# Patient Record
Sex: FEMALE | Race: WHITE
[De-identification: names, ages, dates, MRNs, and addresses within clinical notes are randomized per-mention and may not be internally consistent; named-entity substitution may affect disease eponyms.]

---
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
## 2020-03-31 ENCOUNTER — HOSPITAL ENCOUNTER (INPATIENT)
Dept: HOSPITAL 56 - MW.OB | Age: 20
LOS: 2 days | Discharge: HOME | DRG: 560 | End: 2020-04-02
Attending: OBSTETRICS & GYNECOLOGY | Admitting: OBSTETRICS & GYNECOLOGY
Payer: COMMERCIAL

## 2020-03-31 DIAGNOSIS — Z3A.40: ICD-10-CM

## 2020-03-31 DIAGNOSIS — Z88.0: ICD-10-CM

## 2020-03-31 DIAGNOSIS — O48.0: Primary | ICD-10-CM

## 2020-03-31 NOTE — PCM.PREANE
Preanesthetic Assessment





- Procedure


Proposed Procedure: 


continuous labor epidural





- Anesthesia/Transfusion/Family Hx


Anesthesia History: No Prior Anesthesia


Family History of Anesthesia Reaction: Yes


Family History of Anesthesia Reaction, Other: grandmother shakes when coming 

out of anesthesia





- Review of Systems


General: No Symptoms


Pulmonary: No Symptoms


Cardiovascular: No Symptoms, Other (METS>4, no SOB with stairs or when walk)


Gastrointestinal: No Symptoms


Neurological: Headache


Other: Reports: Depression, Anxiety





- Physical Assessment


NPO Status Date: 03/31/20


NPO Status Time: 21:30


Height: 5 ft 1 in


Weight: 87.543 kg


ASA Class: 2


Mental Status: Alert & Oriented x3


Airway Class: Mallampati = 2


Dentition: Reports: Normal Dentition


Thyro-Mental Finger Breadths: 4


Mouth Opening Finger Breadths: 3


ROM/Head Extension: Full


Lungs: Clear to Auscultation, Normal Respiratory Effort


Cardiovascular: Regular Rate, Regular Rhythm





- Lab


Values: 





 Laboratory Last Values











WBC  10.97 K/uL (4.0-11.0)   03/31/20  15:40    


 


RBC  4.06 M/uL (4.30-5.90)  L  03/31/20  15:40    


 


Hgb  11.5 g/dL (12.0-16.0)  L  03/31/20  15:40    


 


Hct  35.1 % (36.0-46.0)  L  03/31/20  15:40    


 


MCV  86.5 fL (80.0-98.0)   03/31/20  15:40    


 


MCH  28.3 pg (27.0-32.0)   03/31/20  15:40    


 


MCHC  32.8 g/dL (31.0-37.0)   03/31/20  15:40    


 


RDW Std Deviation  43.2 fl (28.0-62.0)   03/31/20  15:40    


 


RDW Coeff of Franki  14 % (11.0-15.0)   03/31/20  15:40    


 


Plt Count  185 K/uL (150-400)   03/31/20  15:40    


 


MPV  10.10 fL (7.40-12.00)   03/31/20  15:40    


 


Nucleated RBC %  0.0 /100WBC  03/31/20  15:40    


 


Nucleated RBCs #  0 K/uL  03/31/20  15:40    


 


Creatinine  0.5 mg/dL (0.6-1.0)  L  03/31/20  15:40    


 


Blood Type  AB POSITIVE   03/31/20  15:40    


 


Antibody Screen  NEGATIVE   03/31/20  15:40    














- Allergies


Allergies/Adverse Reactions: 


 Allergies











Allergy/AdvReac Type Severity Reaction Status Date / Time


 


amoxicillin Allergy  Hives Verified 03/31/20 15:19


 


Penicillins Allergy  Hives Verified 03/31/20 15:19














- Acknowledgements


Anesthesia Type Planned: General Anesthesia (epidural with possiblity of spinal 

or general anesthesia), Spinal, Epidural





PreAnesthesia Questionnaire





- Past Health History


Medical/Surgical History: Denies Medical/Surgical History


OB/GYN History: Reports: Pregnancy


Psychiatric History: Reports: Depression





- Infectious Disease History


Infectious Disease History: Reports: Chicken Pox





- SUBSTANCE USE


Smoking Status *Q: Never Smoker


Recreational Drug Use History: No





- HOME MEDS


Home Medications: 


 Home Meds





Ondansetron [Zofran] 4 mg PO Q4H PRN #12 tab 12/06/19 [Rx]


Pnv No.95/Ferrous Fum/Folic AC [Prenatal Caplet] 1 each PO DAILY 12/06/19 [

History]











- CURRENT (IN HOUSE) MEDS


Current Meds: 





 Current Medications





Butorphanol Tartrate (Stadol)  1 mg IVPUSH Q1H PRN


   PRN Reason: Pain


Carboprost Tromethamine (Hemabate Ds)  250 mcg IM ASDIRECTED PRN


   PRN Reason: Post Partum Hemorrhage


Lactated Ringer's (Ringers, Lactated)  1,000 mls @ 150 mls/hr IV ASDIRECTED YVONNE


   Last Admin: 03/31/20 23:19 Dose:  150 mls/hr


Oxytocin/Sodium Chloride (Oxytocin 30 Unit/500 Ml-Ns)  30 unit in 500 mls @ 500 

mls/hr IV TITRATE YVONNE


Tranexamic Acid 1,000 mg/ (Sodium Chloride)  110 mls @ 660 mls/hr IV ONETIME PRN


   PRN Reason: Bleeding


Oxytocin/Sodium Chloride (Oxytocin 30 Unit/500 Ml-Ns)  30 unit in 500 mls @ 2 

mls/hr IV TITRATE YVONNE; Protocol


   Last Titration: 03/31/20 21:00 Dose:  16 munits/min, 16 mls/hr


Vancomycin HCl 1.75 gm/ Sodium (Chloride)  500 mls @ 250 mls/hr IV Q8H YVONNE


   Last Admin: 03/31/20 17:30 Dose:  250 mls/hr


Lidocaine HCl (Xylocaine 1%)  50 ml INJECT ONETIME PRN


   PRN Reason: Laceration repair


Methylergonovine Maleate (Methergine)  0.2 mg IM ASDIRECTED PRN


   PRN Reason: Post Partum Hemorrhage


Misoprostol (Cytotec)  200 mcg PO ONETIME PRN


   PRN Reason: Post Partum Hemorrhage


Misoprostol (Cytotec)  25 mcg VAG ONETIME PRN


   PRN Reason: Cervical Ripening


Misoprostol (Cytotec)  25 mcg VAG Q4H PRN


   PRN Reason: Cervical Ripening


Nalbuphine HCl (Nubain)  10 mg IVPUSH Q1H PRN


   PRN Reason: Pain (severe 7-10)


Sodium Chloride (Saline Flush)  10 ml FLUSH ASDIRECTED PRN


   PRN Reason: Keep Vein Open


Sodium Chloride (Saline Flush)  2.5 ml FLUSH ASDIRECTED PRN


   PRN Reason: Keep Vein Open


Sodium Chloride (Normal Saline)  10 ml IV ASDIRECTED PRN


   PRN Reason: IV Use


Sterile Water (Sterile Water For Irrigation)  1,000 ml IRR ASDIRECTED PRN


   PRN Reason: delivery


Terbutaline Sulfate (Brethine)  0.25 mg SUBCUT ASDIRECTED PRN


   PRN Reason: Tacysystole


Vancomycin HCl (Pharmacy To Dose - Vancomycin)  1 dose .XX ASDIRECTED YVONNE





Discontinued Medications





Oxytocin/Sodium Chloride (Oxytocin 30 Unit/500 Ml-Ns) Confirm Administered Dose 

30 unit in 500 mls @ as directed .ROUTE .COCC-MED ONE


   Stop: 03/31/20 17:05


   Last Admin: 03/31/20 19:59 Dose:  Not Given


Fentanyl/Bupivacaine HCl (Fentanyl/Bupivacaine/Ns 2 Mcg-0.125% 250 Ml) Confirm 

Administered Dose 250 mls @ as directed .ROUTE .STK-MED ONE


   Stop: 03/31/20 22:48

## 2020-04-01 PROCEDURE — 3E0R3BZ INTRODUCTION OF ANESTHETIC AGENT INTO SPINAL CANAL, PERCUTANEOUS APPROACH: ICD-10-PCS | Performed by: OBSTETRICS & GYNECOLOGY

## 2020-04-01 PROCEDURE — 00HU33Z INSERTION OF INFUSION DEVICE INTO SPINAL CANAL, PERCUTANEOUS APPROACH: ICD-10-PCS | Performed by: OBSTETRICS & GYNECOLOGY

## 2020-04-01 PROCEDURE — 0KQM0ZZ REPAIR PERINEUM MUSCLE, OPEN APPROACH: ICD-10-PCS | Performed by: OBSTETRICS & GYNECOLOGY

## 2020-04-01 PROCEDURE — 10907ZC DRAINAGE OF AMNIOTIC FLUID, THERAPEUTIC FROM PRODUCTS OF CONCEPTION, VIA NATURAL OR ARTIFICIAL OPENING: ICD-10-PCS | Performed by: OBSTETRICS & GYNECOLOGY

## 2020-04-01 NOTE — OR
SURGEON:

DARRION CARABALLO

 

DATE OF PROCEDURE:2020

 

PREOPERATIVE DIAGNOSES:

A 19-year-old G1, P0, at 40 weeks 4 days, admitted for induction of labor

secondary to late deceleration noted on NST, also GBS positive, allergic to

ampicillin and resistant to clindamycin.

 

POSTOPERATIVE DIAGNOSES:

A 19-year-old G1, P0, at 40 weeks 4 days, admitted for induction of labor

secondary to late deceleration noted on NST, also GBS positive, allergic to

ampicillin and resistant to clindamycin.

 

PROCEDURE:

1. Normal spontaneous vaginal delivery.

2. Repair of second-degree vaginal laceration.

 

ESTIMATED BLOOD LOSS:

300.

 

ANESTHESIA:

Epidural and 1% lidocaine 5 mL.

 

COMPLICATION:

None.

 

FINDING:

Live male  delivered at 4:17 a.m.  Apgar scores 8 and 9, weight is 3370

g.

 

BRIEF HISTORY ABOUT THE PATIENT:

She is 19-year-old G1, P0, at 40 weeks and 4 days, who came in for routine

prenatal care.  She was having postdate surveillance, noted to have late

deceleration on NST, as a result she was counseled for induction of labor.  The

patient was about 3 cm dilated.  Induction of labor was started with Pitocin.

The patient then had AROM done which was clear fluid noted, and she had a normal

labor curve and she became fully dilated.  With patient being fully dilated, she

was encouraged to push.

 

DESCRIPTION OF PROCEDURE:

With good pushing effort, she delivered the head, subsequently by the anterior

and posterior shoulder.  Body of the infant was delivered.  Cord was clamped and

cut.  Infant was handed over to the awaiting nursery nurse.  IV Pitocin was

started.  Placenta was delivered via controlled cord traction.  The perineum was

inspected.  A second-degree laceration was noted which was repaired in layer

with 2-0 Monocryl.  After repair, the cervix was inspected and was noted to be

hemostatic, and the vaginal lochia was minimal.  The patient was left in the

Labor and Delivery room in stable condition.

 

 

TAMMIE AVERY

DD:  2020 05:33:02

DT:  2020 06:03:51

Job #:  [id number]

[email or web address]

## 2020-04-01 NOTE — PCM.DEL
L & D Note





- General Info


Date of Service: 20





- Delivery Note


Labor: Augmented by Oxytocin


Delivery Outcome: Livebirth


Infant Delivery Method: Spontaneous Vaginal Delivery-Single


Birth Presentation: Right Occiput Anterior (ELISABETH)


Nuchal Cord: None


Anesthesia Type: Epidural


Anesthetic: Lidocaine (Xylocaine) 1% Plain


Local Anesthetic Volume: 5cc


Amniotic Fluid Description: Clear


Laceration: 2nd Degree


Suture type: Other (monocryl)


Suture size: 2-0


Placenta: Intact


Cord: 3 Vessels


Estimated Blood Loss: 300


Resuscitation Needed: No


: Suctioned, Bulb Syringe, Stimulated, Warmed


APGAR Score 1 min: 8


APGAR Score 5 min: 9


Second Stage Interventions: Reports: Laboring Down, Pushing Effectively


Delivery Comments (Free Text/Narrative):: 


Live Male  delivered at 417am , Apgar 8/9 weight 3370g 








- General Info


Date of Service: 20





- Patient Data


Weight - Most Recent: 87.543 kg


Lab Results Last 24 Hours: 


 Laboratory Results - last 24 hr











  20 Range/Units





  15:40 15:40 15:40 


 


WBC  10.97    (4.0-11.0)  K/uL


 


RBC  4.06 L    (4.30-5.90)  M/uL


 


Hgb  11.5 L    (12.0-16.0)  g/dL


 


Hct  35.1 L    (36.0-46.0)  %


 


MCV  86.5    (80.0-98.0)  fL


 


MCH  28.3    (27.0-32.0)  pg


 


MCHC  32.8    (31.0-37.0)  g/dL


 


RDW Std Deviation  43.2    (28.0-62.0)  fl


 


RDW Coeff of Franki  14    (11.0-15.0)  %


 


Plt Count  185    (150-400)  K/uL


 


MPV  10.10    (7.40-12.00)  fL


 


Nucleated RBC %  0.0    /100WBC


 


Nucleated RBCs #  0    K/uL


 


Creatinine    0.5 L  (0.6-1.0)  mg/dL


 


Blood Type   AB POSITIVE   


 


Antibody Screen   NEGATIVE   











Med Orders - Current: 


 Current Medications





Acetaminophen (Tylenol Extra Strength)  500 mg PO Q4H PRN


   PRN Reason: Pain


Acetaminophen (Tylenol Extra Strength)  1,000 mg PO Q4H PRN


   PRN Reason: Pain


Benzocaine/Menthol (Dermoplast Pain Relief 20%-0.5% Spray)  78 gm TOP 

ASDIRECTED PRN


   PRN Reason: Perineal Comfort Measure


Bisacodyl (Dulcolax)  10 mg RECTAL ONETIME PRN


   PRN Reason: Constipation


Butorphanol Tartrate (Stadol)  1 mg IVPUSH Q1H PRN


   PRN Reason: Pain


Carboprost Tromethamine (Hemabate Ds)  250 mcg IM ASDIRECTED PRN


   PRN Reason: Post Partum Hemorrhage


Docusate Sodium (Colace)  100 mg PO BID PRN


   PRN Reason: Constipation


Emollient Ointment (Lansinoh Hpa)  0 gm TOP ASDIRECTED PRN


   PRN Reason: Sore Nipples


Lactated Ringer's (Ringers, Lactated)  1,000 mls @ 150 mls/hr IV ASDIRECTED YVONNE


   Last Admin: 20 00:47 Dose:  150 mls/hr


Oxytocin/Sodium Chloride (Oxytocin 30 Unit/500 Ml-Ns)  30 unit in 500 mls @ 500 

mls/hr IV TITRATE [facility]


Tranexamic Acid 1,000 mg/ (Sodium Chloride)  110 mls @ 660 mls/hr IV ONETIME PRN


   PRN Reason: Bleeding


Oxytocin/Sodium Chloride (Oxytocin 30 Unit/500 Ml-Ns)  30 unit in 500 mls @ 2 

mls/hr IV TITRATE [facility]; Protocol


   Last Titration: 20 04:18 Dose:  999 mls/hr


Vancomycin HCl 1.75 gm/ Sodium (Chloride)  500 mls @ 250 mls/hr IV Q8H [facility]


   Last Admin: 20 01:04 Dose:  250 mls/hr


Ibuprofen (Motrin)  400 mg PO Q4H PRN


   PRN Reason: Pain


Ibuprofen (Motrin)  800 mg PO Q6H PRN


   PRN Reason: Pain


Lidocaine HCl (Xylocaine 1%)  50 ml INJECT ONETIME PRN


   PRN Reason: Laceration repair


   Last Admin: 20 04:36 Dose:  50 ml


Methylergonovine Maleate (Methergine)  0.2 mg IM ASDIRECTED PRN


   PRN Reason: Post Partum Hemorrhage


Misoprostol (Cytotec)  200 mcg PO ONETIME PRN


   PRN Reason: Post Partum Hemorrhage


Misoprostol (Cytotec)  25 mcg VAG ONETIME PRN


   PRN Reason: Cervical Ripening


Misoprostol (Cytotec)  25 mcg VAG Q4H PRN


   PRN Reason: Cervical Ripening


Nalbuphine HCl (Nubain)  10 mg IVPUSH Q1H PRN


   PRN Reason: Pain (severe 7-10)


Oxycodone HCl (Oxycodone)  5 mg PO Q2H PRN


   PRN Reason: Pain


Sodium Chloride (Saline Flush)  10 ml FLUSH ASDIRECTED PRN


   PRN Reason: Keep Vein Open


Sodium Chloride (Saline Flush)  2.5 ml FLUSH ASDIRECTED PRN


   PRN Reason: Keep Vein Open


Sodium Chloride (Normal Saline)  10 ml IV ASDIRECTED PRN


   PRN Reason: IV Use


Sterile Water (Sterile Water For Irrigation)  1,000 ml IRR ASDIRECTED PRN


   PRN Reason: delivery


   Last Admin: 20 04:36 Dose:  1,000 ml


Terbutaline Sulfate (Brethine)  0.25 mg SUBCUT ASDIRECTED PRN


   PRN Reason: Tacysystole


Vancomycin HCl (Pharmacy To Dose - Vancomycin)  1 dose .XX ASDIRECTED YVONNE


Witch Hazel (Tucks)  1 pad TOP ASDIRECTED PRN


   PRN Reason: comfort care





Discontinued Medications





Fentanyl (Sublimaze) Confirm Administered Dose 100 mcg .ROUTE .STK-MED ONE


   Stop: 20 23:41


   Last Admin: 20 01:01 Dose:  Not Given


Fentanyl (Sublimaze) Confirm Administered Dose 100 mcg .ROUTE .STK-MED ONE


   Stop: 20 02:21


Oxytocin/Sodium Chloride (Oxytocin 30 Unit/500 Ml-Ns) Confirm Administered Dose 

30 unit in 500 mls @ as directed .ROUTE .STK-MED ONE


   Stop: 20 17:05


   Last Admin: 20 19:59 Dose:  Not Given


Fentanyl/Bupivacaine HCl (Fentanyl/Bupivacaine/Ns 2 Mcg-0.125% 250 Ml) Confirm 

Administered Dose 250 mls @ as directed .ROUTE .STK-MED ONE


   Stop: 20 22:48


   Last Admin: 20 01:01 Dose:  Not Given


Lidocaine (Xylocaine-Mpf 2%) Confirm Administered Dose 5 ml .ROUTE .STK-MED ONE


   Stop: 20 23:59


   Last Admin: 20 01:01 Dose:  Not Given


Lidocaine (Xylocaine-Mpf 2%) Confirm Administered Dose 5 ml .ROUTE .STK-MED ONE


   Stop: 20 02:22











- Problem List & Annotations


(1) Vaginal delivery


SNOMED Code(s): 031939799


   Code(s): O80 - ENCOUNTER FOR FULL-TERM UNCOMPLICATED DELIVERY   Status: 

Acute   Current Visit: Yes   





- Problem List Review


Problem List Initiated/Reviewed/Updated: Yes





- My Orders


Last 24 Hours: 


My Active Orders





20 05:25


Patient Status [ADT] Routine 


May Shower [RC] ASDIRECTED 


Up ad Kathleen [RC] ASDIRECTED 


Vital Signs [RC] PER UNIT ROUTINE 


Acetaminophen [Tylenol Extra Strength]   1,000 mg PO Q4H PRN 


Acetaminophen [Tylenol Extra Strength]   500 mg PO Q4H PRN 


Benzocaine/Menthol [Dermoplast Pain Relief 20%-0.5% Spray]   78 gm TOP 

ASDIRECTED PRN 


Docusate Sodium [Colace]   100 mg PO BID PRN 


Ibuprofen [Motrin]   400 mg PO Q4H PRN 


Ibuprofen [Motrin]   800 mg PO Q6H PRN 


Lanolin [Lansinoh HPA]   See Dose Instructions  TOP ASDIRECTED PRN 


bisacodyL [Dulcolax]   10 mg RECTAL ONETIME PRN 


oxyCODONE   5 mg PO Q2H PRN 


witch hazeL [Tucks]   1 pad TOP ASDIRECTED PRN 


Assess Lochia [WOMSER] Per Unit Routine 


Assess Uterine Involution [WOMSER] Per Unit Routine 


Peripheral IV Discontinue [OM.PC] Routine 


Resuscitation Status Routine 





20 05:11


HEMOGLOBIN/HEMATOCRIT,HH [HEME] Timed

## 2020-04-02 NOTE — PCM.PNPP
- General Info


Date of Service: 20


Subjective Update: 





Patient reports bleeding significantly decreased today. No complaints. 

Breastfeeding going well.


Functional Status: Reports: Pain Controlled, Tolerating Diet, Ambulating, 

Urinating





- Review of Systems


General: Reports: No Symptoms


HEENT: Reports: No Symptoms


Pulmonary: Reports: No Symptoms


Cardiovascular: Reports: No Symptoms


Gastrointestinal: Reports: No Symptoms


Genitourinary: Reports: No Symptoms


Musculoskeletal: Reports: No Symptoms


Skin: Reports: No Symptoms


Neurological: Reports: No Symptoms


Psychiatric: Reports: No Symptoms





- Patient Data


Vital Signs - Most Recent: 


 Last Vital Signs











Temp  36.4 C   20 04:26


 


Pulse  90   20 04:26


 


Resp  16   20 04:26


 


BP  90/43 L  20 04:26


 


Pulse Ox  96   20 04:26











Weight - Most Recent: 87.543 kg


Lab Results - Last 24 Hours: 


 Laboratory Results - last 24 hr











  20 Range/Units





  04:48 


 


Hgb  9.0 L  (12.0-16.0)  g/dL


 


Hct  27.3 L  (36.0-46.0)  %











Med Orders - Current: 


 Current Medications





Acetaminophen (Tylenol Extra Strength)  500 mg PO Q4H PRN


   PRN Reason: Pain


Acetaminophen (Tylenol Extra Strength)  1,000 mg PO Q4H PRN


   PRN Reason: Pain


Benzocaine/Menthol (Dermoplast Pain Relief 20%-0.5% Spray)  78 gm TOP 

ASDIRECTED PRN


   PRN Reason: Perineal Comfort Measure


Bisacodyl (Dulcolax)  10 mg RECTAL ONETIME PRN


   PRN Reason: Constipation


Butorphanol Tartrate (Stadol)  1 mg IVPUSH Q1H PRN


   PRN Reason: Pain


Carboprost Tromethamine (Hemabate Ds)  250 mcg IM ASDIRECTED PRN


   PRN Reason: Post Partum Hemorrhage


Docusate Sodium (Colace)  100 mg PO BID PRN


   PRN Reason: Constipation


Emollient Ointment (Lansinoh Hpa)  0 gm TOP ASDIRECTED PRN


   PRN Reason: Sore Nipples


Lactated Ringer's (Ringers, Lactated)  1,000 mls @ 150 mls/hr IV ASDIRECTED [facility]VONNE


   Last Admin: 20 00:47 Dose:  150 mls/hr


Oxytocin/Sodium Chloride (Oxytocin 30 Unit/500 Ml-Ns)  30 unit in 500 mls @ 500 

mls/hr IV TITRATE [facility]


Tranexamic Acid 1,000 mg/ (Sodium Chloride)  110 mls @ 660 mls/hr IV ONETIME PRN


   PRN Reason: Bleeding


Oxytocin/Sodium Chloride (Oxytocin 30 Unit/500 Ml-Ns)  30 unit in 500 mls @ 2 

mls/hr IV TITRATE [facility]; Protocol


   Last Titration: 20 04:18 Dose:  999 mls/hr


Ibuprofen (Motrin)  400 mg PO Q4H PRN


   PRN Reason: Pain


Ibuprofen (Motrin)  800 mg PO Q6H PRN


   PRN Reason: Pain


Lidocaine HCl (Xylocaine 1%)  50 ml INJECT ONETIME PRN


   PRN Reason: Laceration repair


   Last Admin: 20 04:36 Dose:  50 ml


Methylergonovine Maleate (Methergine)  0.2 mg IM ASDIRECTED PRN


   PRN Reason: Post Partum Hemorrhage


Misoprostol (Cytotec)  200 mcg PO ONETIME PRN


   PRN Reason: Post Partum Hemorrhage


Misoprostol (Cytotec)  25 mcg VAG ONETIME PRN


   PRN Reason: Cervical Ripening


Misoprostol (Cytotec)  25 mcg VAG Q4H PRN


   PRN Reason: Cervical Ripening


Nalbuphine HCl (Nubain)  10 mg IVPUSH Q1H PRN


   PRN Reason: Pain (severe 7-10)


Oxycodone HCl (Oxycodone)  5 mg PO Q2H PRN


   PRN Reason: Pain


Sodium Chloride (Saline Flush)  10 ml FLUSH ASDIRECTED PRN


   PRN Reason: Keep Vein Open


Sodium Chloride (Saline Flush)  2.5 ml FLUSH ASDIRECTED PRN


   PRN Reason: Keep Vein Open


Sodium Chloride (Normal Saline)  10 ml IV ASDIRECTED PRN


   PRN Reason: IV Use


Sterile Water (Sterile Water For Irrigation)  1,000 ml IRR ASDIRECTED PRN


   PRN Reason: delivery


   Last Admin: 20 04:36 Dose:  1,000 ml


Terbutaline Sulfate (Brethine)  0.25 mg SUBCUT ASDIRECTED PRN


   PRN Reason: Tacysystole


Vancomycin HCl (Pharmacy To Dose - Vancomycin)  1 dose .XX ASDIRECTED [facility]


Witch Hazel (Tucks)  1 pad TOP ASDIRECTED PRN


   PRN Reason: comfort care





Discontinued Medications





Fentanyl (Sublimaze) Confirm Administered Dose 100 mcg .ROUTE .STK-MED ONE


   Stop: 20 23:41


   Last Admin: 20 01:01 Dose:  Not Given


Fentanyl (Sublimaze) Confirm Administered Dose 100 mcg .ROUTE .STK-MED ONE


   Stop: 20 02:21


   Last Admin: 20 07:53 Dose:  Not Given


Vancomycin HCl 1.75 gm/ Sodium (Chloride)  500 mls @ 250 mls/hr IV Q8H YVONNE


   Last Admin: 20 20:59 Dose:  Not Given


Oxytocin/Sodium Chloride (Oxytocin 30 Unit/500 Ml-Ns) Confirm Administered Dose 

30 unit in 500 mls @ as directed .ROUTE .STK-MED ONE


   Stop: 20 17:05


   Last Admin: 20 19:59 Dose:  Not Given


Fentanyl/Bupivacaine HCl (Fentanyl/Bupivacaine/Ns 2 Mcg-0.125% 250 Ml) Confirm 

Administered Dose 250 mls @ as directed .ROUTE .STK-MED ONE


   Stop: 20 22:48


   Last Admin: 20 01:01 Dose:  Not Given


Gentamicin Sulfate 100 mg/ (Sodium Chloride)  52.5 mls @ 100 mls/hr IV ONETIME 

ONE


   Stop: 20 06:36


   Last Admin: 20 07:03 Dose:  100 mls/hr


Lidocaine (Xylocaine-Mpf 2%) Confirm Administered Dose 5 ml .ROUTE .STK-MED ONE


   Stop: 20 23:59


   Last Admin: 20 01:01 Dose:  Not Given


Lidocaine (Xylocaine-Mpf 2%) Confirm Administered Dose 5 ml .ROUTE .STK-MED ONE


   Stop: 20 02:22


   Last Admin: 20 07:53 Dose:  Not Given











- Infant Interaction


Infant Disposition, Postpartum:  in Room with Family


Infant Interaction: Holding Infant


Infant Feeding:  Infant; Nursed Well





- Postpartum Recovery Exam


Fundal Tone: Firm


Fundal Level: 1 Fingerbreadths Below Umbilicus


Fundal Placement: Midline


Lochia Amount: Scant


Lochia Color: Rubra/Red


Other Perinuem Description: 2nd degree laceration


Bladder Status: Voiding


Urinary Elimination: Voided





- Exam


General: Alert, Oriented


Neck: Supple


Lungs: Clear to Auscultation, Normal Respiratory Effort


Cardiovascular: Regular Rate, Regular Rhythm


GI/Abdominal Exam: Soft, Non-Tender


Extremities: Non-Tender


Skin: Warm, Dry, Intact


Neurological: No New Focal Deficit


Psy/Mental Status: Alert, Normal Affect, Normal Mood





- Problem List & Annotations


(1) Vaginal delivery


SNOMED Code(s): 343481000


   Code(s): O80 - ENCOUNTER FOR FULL-TERM UNCOMPLICATED DELIVERY   Status: 

Acute   Current Visit: Yes   





- Problem List Review


Problem List Initiated/Reviewed/Updated: Yes





- My Orders


Last 24 Hours: 


My Active Orders





20 08:12


Ready for Discharge [RC] PER UNIT ROUTINE 














- Assessment


Assessment:: 





18yo  s/p , PPD#1





- Plan


Plan:: 





Patient desires discharge home today; reviewed discharge instructions.

## 2020-04-02 NOTE — PCM.POSTAN
POST ANESTHESIA ASSESSMENT





- MENTAL STATUS


Mental Status: Alert (Pt stable VSS. No anesthesia complications), Oriented





- VITAL SIGNS


Vital Signs: 


 Last Vital Signs











Temp  97.5 F   04/02/20 04:26


 


Pulse  90   04/02/20 04:26


 


Resp  16   04/02/20 04:26


 


BP  90/43 L  04/02/20 04:26


 


Pulse Ox  96   04/02/20 04:26














- RESPIRATORY


Respiratory Status: Respiratory Rate WNL, Airway Patent, O2 Saturation Stable





- CARDIOVASCULAR


CV Status: Pulse Rate WNL, Blood Pressure Stable





- GASTROINTESTINAL


GI Status: No Symptoms





- POST OP HYDRATION


Hydration Status: Adequate & Stable